# Patient Record
Sex: FEMALE | Race: WHITE | NOT HISPANIC OR LATINO | URBAN - METROPOLITAN AREA
[De-identification: names, ages, dates, MRNs, and addresses within clinical notes are randomized per-mention and may not be internally consistent; named-entity substitution may affect disease eponyms.]

---

## 2019-01-12 ENCOUNTER — EMERGENCY (EMERGENCY)
Facility: HOSPITAL | Age: 54
LOS: 0 days | Discharge: HOME | End: 2019-01-12
Attending: EMERGENCY MEDICINE | Admitting: EMERGENCY MEDICINE
Payer: COMMERCIAL

## 2019-01-12 VITALS — SYSTOLIC BLOOD PRESSURE: 160 MMHG | HEART RATE: 87 BPM | DIASTOLIC BLOOD PRESSURE: 81 MMHG

## 2019-01-12 VITALS — HEIGHT: 58 IN | WEIGHT: 154.98 LBS

## 2019-01-12 DIAGNOSIS — S62.617A DISPLACED FRACTURE OF PROXIMAL PHALANX OF LEFT LITTLE FINGER, INITIAL ENCOUNTER FOR CLOSED FRACTURE: ICD-10-CM
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
## 2019-01-12 PROBLEM — Z00.00 ENCOUNTER FOR PREVENTIVE HEALTH EXAMINATION: Status: ACTIVE | Noted: 2019-01-12

## 2019-01-12 PROCEDURE — 99284 EMERGENCY DEPT VISIT MOD MDM: CPT

## 2019-01-12 PROCEDURE — 73140 X-RAY EXAM OF FINGER(S): CPT | Mod: 26,LT,76

## 2019-01-12 RX ORDER — CEFAZOLIN SODIUM 1 G
1000 VIAL (EA) INJECTION ONCE
Qty: 0 | Refills: 0 | Status: DISCONTINUED | OUTPATIENT
Start: 2019-01-12 | End: 2019-01-12

## 2019-01-12 RX ORDER — IBUPROFEN 200 MG
600 TABLET ORAL ONCE
Qty: 0 | Refills: 0 | Status: COMPLETED | OUTPATIENT
Start: 2019-01-12 | End: 2019-01-12

## 2019-01-12 RX ORDER — CEPHALEXIN 500 MG
1 CAPSULE ORAL
Qty: 12 | Refills: 0
Start: 2019-01-12 | End: 2019-01-14

## 2019-01-12 RX ORDER — CEFAZOLIN SODIUM 1 G
1000 VIAL (EA) INJECTION ONCE
Qty: 0 | Refills: 0 | Status: COMPLETED | OUTPATIENT
Start: 2019-01-12 | End: 2019-01-12

## 2019-01-12 RX ADMIN — Medication 1000 MILLIGRAM(S): at 12:47

## 2019-01-12 NOTE — ED STATDOCS - CARE PLAN
Principal Discharge DX:	Finger dislocation, initial encounter  Secondary Diagnosis:	Finger fracture, left

## 2019-01-12 NOTE — ED ADULT NURSE NOTE - OBJECTIVE STATEMENT
trip and fall today at 11 am with hand extended.  deformity of left pinky.  patient did not take any pain medications PTA.  No LOC, did not hit her head

## 2019-01-12 NOTE — ED STATDOCS - PROGRESS NOTE DETAILS
Patient seen and evaluated.  +fx and dislocation.  Case d/w Dr. Saenz who will evaluate the patient -Justin Warner PA-C finger splinted by Dr. Saenz, she will follow up in the office -Justin Warner PA-C

## 2019-01-12 NOTE — ED STATDOCS - OBJECTIVE STATEMENT
54 y/o female with no significant PMHx presents to the ED s/p mechanical fall. Pt notes she tripped over a pot hole in the drive way and landed on her hands. +left finger pain. Tdap UTD. NKDA. No other complaints at this time.

## 2019-01-12 NOTE — ED STATDOCS - DATE/TIME 1
[address] [facility] 
 
 
 [address] 
[phone] Patient: Carol Burk MRN: [id number] :1952 A check jordyn indicates which time of day the medication should be taken. My Medications ASK your doctor about these medications Instructions Each Dose to Equal  
 Morning Noon Evening Bedtime * albuterol 90 mcg/actuation inhaler Commonly known as:  PROAIR HFA Your last dose was: Your next dose is: Take 2 Puffs by inhalation every four (4) hours as needed for Wheezing. 2 Puff * albuterol 2.5 mg /3 mL (0.083 %) nebulizer solution Commonly known as:  PROVENTIL VENTOLIN Your last dose was: Your next dose is:    
   
   
 Use every 4 hours in nebulizer machine  
     
   
   
   
  
 amphetamine-dextroamphetamine XR 30 mg XR capsule Commonly known as:  ADDERALL XR Your last dose was: Your next dose is: Take 30 mg by mouth every morningIndications: Narcolepsy Syndrome. 30 mg  
    
   
   
   
  
 ARMOUR THYROID 15 mg tablet Generic drug:  thyroid (Pork) Your last dose was: Your next dose is: Take 15 mg by mouth daily. 15 mg  
    
   
   
   
  
 budesonide-formoterol 160-4.5 mcg/actuation Hfaa Commonly known as:  SYMBICORT Your last dose was: Your next dose is: Take 2 Puffs by inhalation two (2) times a day. 2 Puff  
    
   
   
   
  
 dicyclomine 10 mg capsule Commonly known as:  BENTYL Your last dose was: Your next dose is: TAKE 1 CAPSULE BY ORAL ROUTE 3 TIMES EVERY DAY  
     
   
   
   
  
 fluticasone 50 mcg/actuation nasal spray Commonly known as:  Stacey Krunal Your last dose was: Your next dose is: 2 Sprays by Both Nostrils route daily. 2 Spray  
    
   
   
   
  
 ibuprofen 800 mg tablet Commonly known as:  MOTRIN Your last dose was: Your next dose is: Take 1 Tab by mouth every eight (8) hours as needed for Pain. 800 mg LINZESS 145 mcg Cap capsule Generic drug:  linaclotide Your last dose was: Your next dose is: Take 1 Cap by mouth Daily (before breakfast). 145 mcg  
    
   
   
   
  
 lisinopril 10 mg tablet Commonly known as:  Larson Sharon Your last dose was: Your next dose is: Take 1 Tab by mouth daily. 10 mg  
    
   
   
   
  
 multivitamin tablet Commonly known as:  ONE A DAY Your last dose was: Your next dose is: Take 1 Tab by mouth daily. Stop 11/1/11  
 1 Tab OTHER Your last dose was: Your next dose is:    
   
   
 by IntraMUSCular route Before breakfast, lunch, dinner and at bedtime. Indications: Someroelin (human growth hormone) injections-Dr Rodriguez-  
     
   
   
   
  
 pantoprazole 40 mg tablet Commonly known as:  PROTONIX Your last dose was: Your next dose is: One dialy  
     
   
   
   
  
 sucralfate 100 mg/mL suspension Commonly known as:  [facility] Your last dose was: Your next dose is:    
   
   
 1 tsp every 8 hours for esophageal pain * Notice: This list has 2 medication(s) that are the same as other medications prescribed for you. Read the directions carefully, and ask your doctor or other care provider to review them with you. 12-Jan-2019 12:54

## 2019-01-12 NOTE — ED STATDOCS - ATTENDING CONTRIBUTION TO CARE
I, Atul Boggs, performed the initial face to face bedside interview with this patient regarding history of present illness, review of symptoms and relevant past medical, social and family history.  I completed an independent physical examination.  I was the initial provider who evaluated this patient. I have signed out the follow up of any pending tests (i.e. labs, radiological studies) to the ACP.  I have communicated the patient’s plan of care and disposition with the ACP.  The history, relevant review of systems, past medical and surgical history, medical decision making, and physical examination was documented by the scribe in my presence and I attest to the accuracy of the documentation.

## 2019-01-12 NOTE — ED STATDOCS - MUSCULOSKELETAL, MLM
range of motion is not limited. Fifth finger of left hand lateral aspect from proximal phalanx to middle phalanx. +Swelling. Angulated with deformity.

## 2020-01-31 NOTE — ED STATDOCS - NEUROLOGICAL, MLM
Urinary Tract Infection, Adult  Introduction  A urinary tract infection (UTI) is an infection of any part of the urinary tract. The urinary tract includes the:  · Kidneys.  · Ureters.  · Bladder.  · Urethra.  These organs make, store, and get rid of pee (urine) in the body.  Follow these instructions at home:  · Take over-the-counter and prescription medicines only as told by your doctor.  · If you were prescribed an antibiotic medicine, take it as told by your doctor. Do not stop taking the antibiotic even if you start to feel better.  · Avoid the following drinks:  ¨ Alcohol.  ¨ Caffeine.  ¨ Tea.  ¨ Carbonated drinks.  · Drink enough fluid to keep your pee clear or pale yellow.  · Keep all follow-up visits as told by your doctor. This is important.  · Make sure to:  ¨ Empty your bladder often and completely. Do not to hold pee for long periods of time.  ¨ Empty your bladder before and after sex.  ¨ Wipe from front to back after a bowel movement if you are female. Use each tissue one time when you wipe.  Contact a doctor if:  · You have back pain.  · You have a fever.  · You feel sick to your stomach (nauseous).  · You throw up (vomit).  · Your symptoms do not get better after 3 days.  · Your symptoms go away and then come back.  Get help right away if:  · You have very bad back pain.  · You have very bad lower belly (abdominal) pain.  · You are throwing up and cannot keep down any medicines or water.  This information is not intended to replace advice given to you by your health care provider. Make sure you discuss any questions you have with your health care provider.  Document Released: 06/05/2009 Document Revised: 05/25/2017 Document Reviewed: 11/07/2016  © 2017 Elsevier    
sensation is normal and strength is normal.

## 2022-11-16 ENCOUNTER — TRANSCRIPTION ENCOUNTER (OUTPATIENT)
Age: 57
End: 2022-11-16

## 2022-11-16 ENCOUNTER — OUTPATIENT (OUTPATIENT)
Dept: OUTPATIENT SERVICES | Facility: HOSPITAL | Age: 57
LOS: 1 days | End: 2022-11-16
Payer: COMMERCIAL

## 2022-11-16 VITALS
OXYGEN SATURATION: 100 % | TEMPERATURE: 99 F | HEIGHT: 58 IN | RESPIRATION RATE: 14 BRPM | SYSTOLIC BLOOD PRESSURE: 126 MMHG | DIASTOLIC BLOOD PRESSURE: 68 MMHG | WEIGHT: 162.04 LBS | HEART RATE: 82 BPM

## 2022-11-16 DIAGNOSIS — Z01.818 ENCOUNTER FOR OTHER PREPROCEDURAL EXAMINATION: ICD-10-CM

## 2022-11-16 DIAGNOSIS — Z98.890 OTHER SPECIFIED POSTPROCEDURAL STATES: Chronic | ICD-10-CM

## 2022-11-16 DIAGNOSIS — M20.42 OTHER HAMMER TOE(S) (ACQUIRED), LEFT FOOT: ICD-10-CM

## 2022-11-16 DIAGNOSIS — I67.1 CEREBRAL ANEURYSM, NONRUPTURED: Chronic | ICD-10-CM

## 2022-11-16 DIAGNOSIS — Z98.891 HISTORY OF UTERINE SCAR FROM PREVIOUS SURGERY: Chronic | ICD-10-CM

## 2022-11-16 LAB
ALBUMIN SERPL ELPH-MCNC: 3.8 G/DL — SIGNIFICANT CHANGE UP (ref 3.3–5)
ALP SERPL-CCNC: 165 U/L — HIGH (ref 40–120)
ALT FLD-CCNC: 31 U/L — SIGNIFICANT CHANGE UP (ref 12–78)
ANION GAP SERPL CALC-SCNC: 8 MMOL/L — SIGNIFICANT CHANGE UP (ref 5–17)
AST SERPL-CCNC: 22 U/L — SIGNIFICANT CHANGE UP (ref 15–37)
BILIRUB SERPL-MCNC: 0.3 MG/DL — SIGNIFICANT CHANGE UP (ref 0.2–1.2)
BUN SERPL-MCNC: 11 MG/DL — SIGNIFICANT CHANGE UP (ref 7–23)
CALCIUM SERPL-MCNC: 9.1 MG/DL — SIGNIFICANT CHANGE UP (ref 8.5–10.1)
CHLORIDE SERPL-SCNC: 107 MMOL/L — SIGNIFICANT CHANGE UP (ref 96–108)
CO2 SERPL-SCNC: 26 MMOL/L — SIGNIFICANT CHANGE UP (ref 22–31)
CREAT SERPL-MCNC: 0.9 MG/DL — SIGNIFICANT CHANGE UP (ref 0.5–1.3)
EGFR: 76 ML/MIN/1.73M2 — SIGNIFICANT CHANGE UP
GLUCOSE SERPL-MCNC: 98 MG/DL — SIGNIFICANT CHANGE UP (ref 70–99)
HCG UR QL: NEGATIVE — SIGNIFICANT CHANGE UP
HCT VFR BLD CALC: 37.8 % — SIGNIFICANT CHANGE UP (ref 34.5–45)
HGB BLD-MCNC: 12 G/DL — SIGNIFICANT CHANGE UP (ref 11.5–15.5)
MCHC RBC-ENTMCNC: 26.5 PG — LOW (ref 27–34)
MCHC RBC-ENTMCNC: 31.7 GM/DL — LOW (ref 32–36)
MCV RBC AUTO: 83.4 FL — SIGNIFICANT CHANGE UP (ref 80–100)
NRBC # BLD: 0 /100 WBCS — SIGNIFICANT CHANGE UP (ref 0–0)
PLATELET # BLD AUTO: 343 K/UL — SIGNIFICANT CHANGE UP (ref 150–400)
POTASSIUM SERPL-MCNC: 3.9 MMOL/L — SIGNIFICANT CHANGE UP (ref 3.5–5.3)
POTASSIUM SERPL-SCNC: 3.9 MMOL/L — SIGNIFICANT CHANGE UP (ref 3.5–5.3)
PROT SERPL-MCNC: 7.2 G/DL — SIGNIFICANT CHANGE UP (ref 6–8.3)
RBC # BLD: 4.53 M/UL — SIGNIFICANT CHANGE UP (ref 3.8–5.2)
RBC # FLD: 16.9 % — HIGH (ref 10.3–14.5)
SODIUM SERPL-SCNC: 141 MMOL/L — SIGNIFICANT CHANGE UP (ref 135–145)
WBC # BLD: 9.18 K/UL — SIGNIFICANT CHANGE UP (ref 3.8–10.5)
WBC # FLD AUTO: 9.18 K/UL — SIGNIFICANT CHANGE UP (ref 3.8–10.5)

## 2022-11-16 PROCEDURE — 36415 COLL VENOUS BLD VENIPUNCTURE: CPT

## 2022-11-16 PROCEDURE — 93005 ELECTROCARDIOGRAM TRACING: CPT

## 2022-11-16 PROCEDURE — 80053 COMPREHEN METABOLIC PANEL: CPT

## 2022-11-16 PROCEDURE — 93010 ELECTROCARDIOGRAM REPORT: CPT

## 2022-11-16 PROCEDURE — 85027 COMPLETE CBC AUTOMATED: CPT

## 2022-11-16 PROCEDURE — G0463: CPT

## 2022-11-16 PROCEDURE — 81025 URINE PREGNANCY TEST: CPT

## 2022-11-16 NOTE — H&P PST ADULT - NSICDXPASTSURGICALHX_GEN_ALL_CORE_FT
PAST SURGICAL HISTORY:  Brain aneurysm 1977 Age 11    H/O  section X 2 2000 & 2006    H/O colonoscopy     H/O dilation and curettage X 5 following missed ab    S/P breast lumpectomy Left breast 2019 followed by radiation therapy

## 2022-11-16 NOTE — H&P PST ADULT - FALL HARM RISK - UNIVERSAL INTERVENTIONS
Bed in lowest position, wheels locked, appropriate side rails in place/Call bell, personal items and telephone in reach/Instruct patient to call for assistance before getting out of bed or chair/Non-slip footwear when patient is out of bed/[place] to call system/Physically safe environment - no spills, clutter or unnecessary equipment/Purposeful Proactive Rounding/Room/bathroom lighting operational, light cord in reach

## 2022-11-16 NOTE — H&P PST ADULT - HISTORY OF PRESENT ILLNESS
After Visit Summary   11/21/2018    Moo Say    MRN: [id number]           Patient Information     Date Of Birth          1977        Visit Information        Provider Department      11/21/2018 10:00 AM Azul Olivares MD [facility]        Today's Diagnoses     High risk pregnancy, antepartum    -  1    Decreased vision        Nausea/vomiting in pregnancy        8 weeks gestation of pregnancy        RUQ abdominal pain           Follow-ups after your visit        Additional Services     OPHTHALMOLOGY ADULT REFERRAL       Patient prefers to be called    Reason for Referral: Right eye blindness     needed: Yes  Language: Nia    May leave message on voicemail: Yes    ([place] Only) Referral should be tracked (Yes/No)?                  Your next 10 appointments already scheduled     Dec 05, 2018  9:20 AM CST   NEW OB with Azul Olivares MD   [facility] ([facility])    [address] [place] [id number]   [phone]              Future tests that were ordered for you today     Open Future Orders        Priority Expected Expires Ordered    OPHTHALMOLOGY ADULT REFERRAL Routine  11/21/2019 11/21/2018            Who to contact     Please call your clinic at [phone] to:    Ask questions about your health    Make or cancel appointments    Discuss your medicines    Learn about your test results    Speak to your doctor            Additional Information About Your Visit        MyChart Information     Lightswitcht is an electronic gateway that provides easy, online access to your medical records. With [employer], you can request a clinic appointment, read your test results, renew a prescription or communicate with your care team.     To sign up for Lightswitcht visit the website at [email or web address]   You will be asked to enter the access code listed below, as well as some personal information. Please follow the directions to create your username and password.     Your  "access code is: [email or web address]  Expires: 2019  2:46 PM     Your access code will  in 90 days. If you need help or a new code, please contact your [facility] or call [phone] for assistance.        Care EveryWhere ID     This is your Care EveryWhere ID. This could be used by other organizations to access your [facility] medical records  [id number]        Your Vitals Were     Pulse Temperature Respirations Height Last Period BMI (Body Mass Index)    69 97.8  F (36.6  C) 18 5' 4\" (162.6 cm) 2018 (Within Days) 32.2 kg/m2       Blood Pressure from Last 3 Encounters:   18 118/84   18 112/74   10/31/18 118/79    Weight from Last 3 Encounters:   18 187 lb 9.6 oz (85.1 kg)   10/31/18 185 lb 12.8 oz (84.3 kg)              We Performed the Following     ABO/Rh Type-HML ([facility])     Antibody Screen ([facility])     CBC with Plt ([facility])     Chlamydia/Gono Amplified ([facility])     Culture Urine ([facility])     GYN Cytology ([facility])     Hemoglobin A1c ([facility])     Hepatic Profile ([facility])     Hepatitis B Surface Ab ([facility])     Hepatitis B Surface Ag ([facility])     HIV Ag/Ab Screen [place] ([facility])     Lead, Blood ([facility])     Rubella  IgG ([facility])     Syphilis Screen [place] ([facility])     Urinalysis([facility])     Eunice Zoster Imm Status Emmanuelle ([facility])          Today's Medication Changes          These changes are accurate as of 18 11:26 AM.  If you have any questions, ask your nurse or doctor.               Start taking these medicines.        Dose/Directions    meclizine 25 MG tablet   Commonly known as:  ANTIVERT   Used for:  High risk pregnancy, antepartum   Started by:  Azul Olivares MD        Dose:  25 mg   Take 1 tablet (25 mg) by mouth every 6 hours as needed for dizziness For vomiting in pregnancy   Quantity:  30 tablet   Refills:  1            Where to get your medicines      These medications were sent to Our " [facility] - [place], [place] - [address] [facility]  [address], [place] [address]     Phone:  [phone]     doxylamine 25 MG Tabs tablet    meclizine 25 MG tablet    prenatal multivitamin plus iron 27-0.8 MG Tabs per tablet    pyridOXINE 25 MG tablet                Primary Care Provider Office Phone # Fax #    Azul Olivares [phone]       [address] [place] [address]        Equal Access to Services     ALLEN GIFFORD : Hadii aad ku hadasho Soomaali, waaxda luqadaha, qaybta kaalmada adeegyada, waxay idiin hayaan adeeg kharash la'barrie . So [facility] [phone].    ATENCIÓN: Si habla español, tiene a white disposición servicios gratuitos de asistencia lingüística. [facility] [phone].    We comply with applicable federal civil rights laws and [place] laws. We do not discriminate on the basis of race, color, national origin, age, disability, sex, sexual orientation, or gender identity.            Thank you!     Thank you for choosing [facility]  for your care. Our goal is always to provide you with excellent care. Hearing back from our patients is one way we can continue to improve our services. Please take a few minutes to complete the written survey that you may receive in the mail after your visit with us. Thank you!             Your Updated Medication List - Protect others around you: Learn how to safely use, store and throw away your medicines at www.disposemymeds.org.          This list is accurate as of 11/21/18 11:26 AM.  Always use your most recent med list.                   Brand Name Dispense Instructions for use Diagnosis    acetaminophen 325 MG tablet    TYLENOL    100 tablet    Take 2 tablets (650 mg) by mouth every 6 hours as needed for mild pain    Episodic tension-type headache, not intractable       doxylamine 25 MG Tabs tablet    UNISOM    30 each    Take 0.5-1 tablets (12.5-25 mg) by mouth nightly as needed (nausea)    Nausea/vomiting in  pregnancy       meclizine 25 MG tablet    ANTIVERT    30 tablet    Take 1 tablet (25 mg) by mouth every 6 hours as needed for dizziness For vomiting in pregnancy    High risk pregnancy, antepartum       prenatal multivitamin plus iron 27-0.8 MG Tabs per tablet     100 tablet    Take 1 tablet by mouth daily    8 weeks gestation of pregnancy       pyridOXINE 25 MG tablet    vitamin B-6    90 tablet    Take 1 tablet (25 mg) by mouth daily    Nausea/vomiting in pregnancy       timolol 0.5 % ophthalmic solution    TIMOPTIC     1 drop           56 year old female PMH Brain Aneurysm (Age 11), CML, Breast Cancer (DX 2019 Left Breast (S/O Lumpectomy followed by course of Radiation); now with Other Hammertoes Acquired LEFT Foot; presents today for PST prior to Hammertoe Correction LEFT Foot 4th Toe with Mini Fluoroscopy with Dr Camden Schneider on 11/30/2022.     Pt notes her LEFT 4th toe has been bothering her for the last 6-8 weeks.  Notes wearing certain shoes has been difficult for her she has also been wearing her slippers a lot. Pt notes she was seen by Dr Schneider  and following consult, exam and discussions regarding treatment options pt is electing for scheduled procedure.

## 2022-11-16 NOTE — H&P PST ADULT - ASSESSMENT
56 year old female PMH Brain Aneurysm (Age 11), CML, Breast Cancer (DX 2019 Left Breast (S/O Lumpectomy followed by course of Radiation); now with Other Hammertoes Acquired LEFT Foot; scheduled for Hammertoe Correction LEFT Foot 4th Toe with Mini Fluoroscopy with Dr Camden Schneider on 11/30/2022.

## 2022-11-16 NOTE — H&P PST ADULT - PROBLEM SELECTOR PLAN 1
PST labs; CBC, CMP, UcG, EKG. PST labs; CBC, CMP, UcG, EKG. Pt has been seen by her PCP on 11/14/22. Will fax over results for PCP to review and complete medical clearance. Pt has discussed upcoming surgery with her Hematologist who states he is fine for her to continue her Imatanib for her CML. Will call for last note. Pt was however instructed to call her Oncologist Dr Louis to discuss if he wants her to stop her Anastrazole prior to procedure. pt instructed to stop any NSAIDS/Herbal Supplements between now and procedure. may take Tylenol if needed for any pain between now and procedure. Morning of procedure she may take her Synthroid and her Claritin with small sip of water. Pre-op instructions as well as pre-op wash instructions given to pt with understanding verbalized. Discussed with pt she would need to have a COVID swab at [facility] drive thru prior to procedure. Informed pt [facility] admitting department would contact her to schedule her COVID swab appointment. Provided pt with both address and phone number to call  [facility] should she have any questions. Pt verbalizes understanding of all instructions discussed today in [facility]. All questions addressed with pt prior to her leaving the PST department today.

## 2022-11-16 NOTE — H&P PST ADULT - ATTENDING COMMENTS
Pt seen and examined in holding room. Pt denies change in health since last being seen at [facility]. Pt denies CP, palpitations, SOB and KELSEY. Pt denies pain. VSS. Medical clearance in chart. Site and procedure verified with patient. Pt was seen by anesthesia. Consent obtained. Pt to proceed for scheduled procedure and stable for OR. (FRANNY Garcia)

## 2022-11-16 NOTE — H&P PST ADULT - NSICDXPASTMEDICALHX_GEN_ALL_CORE_FT
PAST MEDICAL HISTORY:  Brain aneurysm DX Age 11    Breast cancer LEFT breast Cancer DX 2019 S/P Lumpectomy and Radiation Treatment    CML (chronic myeloid leukemia) Sees Dr Marcel Francis [facility]    Missed abortions X 5    Other hammer toe(s) (acquired), left foot

## 2022-11-23 PROBLEM — C50.919 MALIGNANT NEOPLASM OF UNSPECIFIED SITE OF UNSPECIFIED FEMALE BREAST: Chronic | Status: ACTIVE | Noted: 2022-11-16

## 2022-11-23 PROBLEM — M20.42 OTHER HAMMER TOE(S) (ACQUIRED), LEFT FOOT: Chronic | Status: ACTIVE | Noted: 2022-11-16

## 2022-11-23 PROBLEM — O02.1 MISSED ABORTION: Chronic | Status: ACTIVE | Noted: 2022-11-16

## 2022-11-23 PROBLEM — C92.10 CHRONIC MYELOID LEUKEMIA, BCR/ABL-POSITIVE, NOT HAVING ACHIEVED REMISSION: Chronic | Status: ACTIVE | Noted: 2022-11-16

## 2022-11-23 PROBLEM — I67.1 CEREBRAL ANEURYSM, NONRUPTURED: Chronic | Status: ACTIVE | Noted: 2022-11-16

## 2022-11-28 ENCOUNTER — OUTPATIENT (OUTPATIENT)
Dept: OUTPATIENT SERVICES | Facility: HOSPITAL | Age: 57
LOS: 1 days | End: 2022-11-28
Payer: COMMERCIAL

## 2022-11-28 DIAGNOSIS — Z20.828 CONTACT WITH AND (SUSPECTED) EXPOSURE TO OTHER VIRAL COMMUNICABLE DISEASES: ICD-10-CM

## 2022-11-28 DIAGNOSIS — I67.1 CEREBRAL ANEURYSM, NONRUPTURED: Chronic | ICD-10-CM

## 2022-11-28 DIAGNOSIS — Z98.890 OTHER SPECIFIED POSTPROCEDURAL STATES: Chronic | ICD-10-CM

## 2022-11-28 DIAGNOSIS — Z98.891 HISTORY OF UTERINE SCAR FROM PREVIOUS SURGERY: Chronic | ICD-10-CM

## 2022-11-28 LAB — SARS-COV-2 RNA SPEC QL NAA+PROBE: SIGNIFICANT CHANGE UP

## 2022-11-28 PROCEDURE — U0003: CPT

## 2022-11-28 PROCEDURE — U0005: CPT

## 2022-11-29 ENCOUNTER — TRANSCRIPTION ENCOUNTER (OUTPATIENT)
Age: 57
End: 2022-11-29

## 2022-11-29 NOTE — ASU PATIENT PROFILE, ADULT - NSICDXPASTMEDICALHX_GEN_ALL_CORE_FT
PAST MEDICAL HISTORY:  Brain aneurysm DX Age 11    Breast cancer LEFT breast Cancer DX 2019 S/P Lumpectomy and Radiation Treatment    CML (chronic myeloid leukemia) Sees Dr Marcel Francis [facility]    Missed abortions X 5    Other hammer toe(s) (acquired), left foot

## 2022-11-29 NOTE — ASU PATIENT PROFILE, ADULT - FALL HARM RISK - FALL HARM RISK
Bone Health  Bones protect organs, store calcium, and anchor muscles. Good health habits, such as eating nutritious foods and exercising regularly, are important for maintaining healthy bones. They can also help to prevent a condition that causes bones to lose density and become weak and brittle (osteoporosis).  WHY IS BONE MASS IMPORTANT?  Bone mass refers to the amount of bone tissue that you have. The higher your bone mass, the stronger your bones. An important step toward having healthy bones throughout life is to have strong and dense bones during childhood. A young adult who has a high bone mass is more likely to have a high bone mass later in life. Bone mass at its greatest it is called peak bone mass.  A large decline in bone mass occurs in older adults. In women, it occurs about the time of menopause. During this time, it is important to practice good health habits, because if more bone is lost than what is replaced, the bones will become less healthy and more likely to break (fracture). If you find that you have a low bone mass, you may be able to prevent osteoporosis or further bone loss by changing your diet and lifestyle.  HOW CAN I FIND OUT IF MY BONE MASS IS LOW?  Bone mass can be measured with an X-ray test that is called a bone mineral density (BMD) test. This test is recommended for all women who are age 65 or older. It may also be recommended for men who are age 70 or older, or for people who are more likely to develop osteoporosis due to:  · Having bones that break easily.  · Having a long-term disease that weakens bones, such as kidney disease or rheumatoid arthritis.  · Having menopause earlier than normal.  · Taking medicine that weakens bones, such as steroids, thyroid hormones, or hormone treatment for breast cancer or prostate cancer.  · Smoking.  · Drinking three or more alcoholic drinks each day.  WHAT ARE THE NUTRITIONAL RECOMMENDATIONS FOR HEALTHY BONES?  To have healthy bones, you need  to get enough of the right minerals and vitamins. Most nutrition experts recommend getting these nutrients from the foods that you eat. Nutritional recommendations vary from person to person. Ask your health care provider what is healthy for you. Here are some general guidelines.  Calcium Recommendations  Calcium is the most important (essential) mineral for bone health. Most people can get enough calcium from their diet, but supplements may be recommended for people who are at risk for osteoporosis. Good sources of calcium include:  · Dairy products, such as low-fat or nonfat milk, cheese, and yogurt.  · Dark green leafy vegetables, such as bok dave and broccoli.  · Calcium-fortified foods, such as orange juice, cereal, bread, soy beverages, and tofu products.  · Nuts, such as almonds.  Follow these recommended amounts for daily calcium intake:  · Children, age 1-3: 700 mg.  · Children, age 4-8: 1,000 mg.  · Children, age 9-13: 1,300 mg.  · Teens, age 14-18: 1,300 mg.  · Adults, age 19-50: 1,000 mg.  · Adults, age 51-70:  · Men: 1,000 mg.  · Women: 1,200 mg.  · Adults, age 71 or older: 1,200 mg.  · Pregnant and breastfeeding females:  · Teens: 1,300 mg.  · Adults: 1,000 mg.  Vitamin D Recommendations  Vitamin D is the most essential vitamin for bone health. It helps the body to absorb calcium. Sunlight stimulates the skin to make vitamin D, so be sure to get enough sunlight. If you live in a cold climate or you do not get outside often, your health care provider may recommend that you take vitamin D supplements. Good sources of vitamin D in your diet include:  · Egg yolks.  · Saltwater fish.  · Milk and cereal fortified with vitamin D.  Follow these recommended amounts for daily vitamin D intake:  · Children and teens, age 1-18: 600 international units.  · Adults, age 50 or younger: 400-800 international units.  · Adults, age 51 or older: 800-1,000 international units.  Other Nutrients  Other nutrients for bone  health include:  · Phosphorus. This mineral is found in meat, poultry, dairy foods, nuts, and legumes. The recommended daily intake for adult men and adult women is 700 mg.  · Magnesium. This mineral is found in seeds, nuts, dark green vegetables, and legumes. The recommended daily intake for adult men is 400-420 mg. For adult women, it is 310-320 mg.  · Vitamin K. This vitamin is found in green leafy vegetables. The recommended daily intake is 120 mg for adult men and 90 mg for adult women.  WHAT TYPE OF PHYSICAL ACTIVITY IS BEST FOR BUILDING AND MAINTAINING HEALTHY BONES?  Weight-bearing and strength-building activities are important for building and maintaining peak bone mass. Weight-bearing activities cause muscles and bones to work against gravity. Strength-building activities increases muscle strength that supports bones. Weight-bearing and muscle-building activities include:  · Walking and hiking.  · Jogging and running.  · Dancing.  · Gym exercises.  · Lifting weights.  · Tennis and racquetball.  · Climbing stairs.  · Aerobics.  Adults should get at least 30 minutes of moderate physical activity on most days. Children should get at least 60 minutes of moderate physical activity on most days. Ask your health care provide what type of exercise is best for you.  WHERE CAN I FIND MORE INFORMATION?  For more information, check out the following websites:  · National Osteoporosis Foundation: http://nof.org/learn/basics  · National Institutes of Health: http://www.niams.nih.gov/Health_Info/Bone/Bone_Health/bone_health_for_life.asp     This information is not intended to replace advice given to you by your health care provider. Make sure you discuss any questions you have with your health care provider.     Document Released: 03/09/2005 Document Revised: 05/03/2016 Document Reviewed: 12/23/2015  [employer] Interactive Patient Education ©2016 [employer]  Nonspecific Chest Pain   Chest pain can be caused by many  different conditions. There is always a chance that your pain could be related to something serious, such as a heart attack or a blood clot in your lungs. Chest pain can also be caused by conditions that are not life-threatening. If you have chest pain, it is very important to follow up with your health care provider.  CAUSES   Chest pain can be caused by:  · Heartburn.  · Pneumonia or bronchitis.  · Anxiety or stress.  · Inflammation around your heart (pericarditis) or lung (pleuritis or pleurisy).  · A blood clot in your lung.  · A collapsed lung (pneumothorax). It can develop suddenly on its own (spontaneous pneumothorax) or from trauma to the chest.  · Shingles infection (varicella-zoster virus).  · Heart attack.  · Damage to the bones, muscles, and cartilage that make up your chest wall. This can include:  ¨ Bruised bones due to injury.  ¨ Strained muscles or cartilage due to frequent or repeated coughing or overwork.  ¨ Fracture to one or more ribs.  ¨ Sore cartilage due to inflammation (costochondritis).  RISK FACTORS   Risk factors for chest pain may include:  · Activities that increase your risk for trauma or injury to your chest.  · Respiratory infections or conditions that cause frequent coughing.  · Medical conditions or overeating that can cause heartburn.  · Heart disease or family history of heart disease.  · Conditions or health behaviors that increase your risk of developing a blood clot.  · Having had chicken pox (varicella zoster).  SIGNS AND SYMPTOMS  Chest pain can feel like:  · Burning or tingling on the surface of your chest or deep in your chest.  · Crushing, pressure, aching, or squeezing pain.  · Dull or sharp pain that is worse when you move, cough, or take a deep breath.  · Pain that is also felt in your back, neck, shoulder, or arm, or pain that spreads to any of these areas.  Your chest pain may come and go, or it may stay constant.  DIAGNOSIS  Lab tests or other studies may be needed  to find the cause of your pain. Your health care provider may have you take a test called an ambulatory ECG (electrocardiogram). An ECG records your heartbeat patterns at the time the test is performed. You may also have other tests, such as:  · Transthoracic echocardiogram (TTE). During echocardiography, sound waves are used to create a picture of all of the heart structures and to look at how blood flows through your heart.  · Transesophageal echocardiogram (HARDEEP). This is a more advanced imaging test that obtains images from inside your body. It allows your health care provider to see your heart in finer detail.  · Cardiac monitoring. This allows your health care provider to monitor your heart rate and rhythm in real time.  · Holter monitor. This is a portable device that records your heartbeat and can help to diagnose abnormal heartbeats. It allows your health care provider to track your heart activity for several days, if needed.  · Stress tests. These can be done through exercise or by taking medicine that makes your heart beat more quickly.  · Blood tests.  · Imaging tests.  TREATMENT   Your treatment depends on what is causing your chest pain. Treatment may include:  · Medicines. These may include:  ¨ Acid blockers for heartburn.  ¨ Anti-inflammatory medicine.  ¨ Pain medicine for inflammatory conditions.  ¨ Antibiotic medicine, if an infection is present.  ¨ Medicines to dissolve blood clots.  ¨ Medicines to treat coronary artery disease.  · Supportive care for conditions that do not require medicines. This may include:  ¨ Resting.  ¨ Applying heat or cold packs to injured areas.  ¨ Limiting activities until pain decreases.  HOME CARE INSTRUCTIONS  · If you were prescribed an antibiotic medicine, finish it all even if you start to feel better.  · Avoid any activities that bring on chest pain.  · Do not use any tobacco products, including cigarettes, chewing tobacco, or electronic cigarettes. If you need help  quitting, ask your health care provider.  · Do not drink alcohol.  · Take medicines only as directed by your health care provider.  · Keep all follow-up visits as directed by your health care provider. This is important. This includes any further testing if your chest pain does not go away.  · If heartburn is the cause for your chest pain, you may be told to keep your head raised (elevated) while sleeping. This reduces the chance that acid will go from your stomach into your esophagus.  · Make lifestyle changes as directed by your health care provider. These may include:  ¨ Getting regular exercise. Ask your health care provider to suggest some activities that are safe for you.  ¨ Eating a heart-healthy diet. A registered dietitian can help you to learn healthy eating options.  ¨ Maintaining a healthy weight.  ¨ Managing diabetes, if necessary.  ¨ Reducing stress.  SEEK MEDICAL CARE IF:  · Your chest pain does not go away after treatment.  · You have a rash with blisters on your chest.  · You have a fever.  SEEK IMMEDIATE MEDICAL CARE IF:   · Your chest pain is worse.  · You have an increasing cough, or you cough up blood.  · You have severe abdominal pain.  · You have severe weakness.  · You faint.  · You have chills.  · You have sudden, unexplained chest discomfort.  · You have sudden, unexplained discomfort in your arms, back, neck, or jaw.  · You have shortness of breath at any time.  · You suddenly start to sweat, or your skin gets clammy.  · You feel nauseous or you vomit.  · You suddenly feel light-headed or dizzy.  · Your heart begins to beat quickly, or it feels like it is skipping beats.  These symptoms may represent a serious problem that is an emergency. Do not wait to see if the symptoms will go away. Get medical help right away. Call your local emergency services (911 in the [place] Do not drive yourself to the hospital.     This information is not intended to replace advice given to you by your health  care provider. Make sure you discuss any questions you have with your health care provider.     Document Released: 09/27/2006 Document Revised: 01/08/2016 Document Reviewed: 07/24/2015  [employer] Interactive Patient Education ©2016 [employer]     No indicators present

## 2022-11-30 ENCOUNTER — OUTPATIENT (OUTPATIENT)
Dept: OUTPATIENT SERVICES | Facility: HOSPITAL | Age: 57
LOS: 1 days | End: 2022-11-30
Payer: COMMERCIAL

## 2022-11-30 ENCOUNTER — TRANSCRIPTION ENCOUNTER (OUTPATIENT)
Age: 57
End: 2022-11-30

## 2022-11-30 VITALS
HEIGHT: 58 IN | RESPIRATION RATE: 14 BRPM | HEART RATE: 86 BPM | SYSTOLIC BLOOD PRESSURE: 166 MMHG | TEMPERATURE: 98 F | WEIGHT: 162.04 LBS | DIASTOLIC BLOOD PRESSURE: 58 MMHG | OXYGEN SATURATION: 100 %

## 2022-11-30 VITALS
HEART RATE: 86 BPM | OXYGEN SATURATION: 99 % | DIASTOLIC BLOOD PRESSURE: 68 MMHG | RESPIRATION RATE: 14 BRPM | SYSTOLIC BLOOD PRESSURE: 136 MMHG

## 2022-11-30 DIAGNOSIS — I67.1 CEREBRAL ANEURYSM, NONRUPTURED: Chronic | ICD-10-CM

## 2022-11-30 DIAGNOSIS — Z98.891 HISTORY OF UTERINE SCAR FROM PREVIOUS SURGERY: Chronic | ICD-10-CM

## 2022-11-30 DIAGNOSIS — Z01.818 ENCOUNTER FOR OTHER PREPROCEDURAL EXAMINATION: ICD-10-CM

## 2022-11-30 DIAGNOSIS — Z98.890 OTHER SPECIFIED POSTPROCEDURAL STATES: Chronic | ICD-10-CM

## 2022-11-30 DIAGNOSIS — M20.42 OTHER HAMMER TOE(S) (ACQUIRED), LEFT FOOT: ICD-10-CM

## 2022-11-30 LAB — HCG UR QL: NEGATIVE — SIGNIFICANT CHANGE UP

## 2022-11-30 PROCEDURE — C1769: CPT

## 2022-11-30 PROCEDURE — C1713: CPT

## 2022-11-30 PROCEDURE — 73630 X-RAY EXAM OF FOOT: CPT | Mod: 26,LT

## 2022-11-30 PROCEDURE — 28285 REPAIR OF HAMMERTOE: CPT | Mod: T3

## 2022-11-30 PROCEDURE — 81025 URINE PREGNANCY TEST: CPT

## 2022-11-30 PROCEDURE — 76000 FLUOROSCOPY <1 HR PHYS/QHP: CPT

## 2022-11-30 PROCEDURE — 73630 X-RAY EXAM OF FOOT: CPT

## 2022-11-30 DEVICE — GWIRE TROCAR TIP 0.86MM MUST ORD MULT OF 6 EA: Type: IMPLANTABLE DEVICE | Site: LEFT | Status: FUNCTIONAL

## 2022-11-30 DEVICE — IMPLANTABLE DEVICE: Type: IMPLANTABLE DEVICE | Site: LEFT | Status: FUNCTIONAL

## 2022-11-30 RX ORDER — ONDANSETRON 8 MG/1
1 TABLET, FILM COATED ORAL
Qty: 0 | Refills: 0 | DISCHARGE

## 2022-11-30 RX ORDER — SODIUM CHLORIDE 9 MG/ML
1000 INJECTION, SOLUTION INTRAVENOUS
Refills: 0 | Status: DISCONTINUED | OUTPATIENT
Start: 2022-11-30 | End: 2022-12-01

## 2022-11-30 RX ORDER — IMATINIB MESYLATE 400 MG/1
1 TABLET, FILM COATED ORAL
Qty: 0 | Refills: 0 | DISCHARGE

## 2022-11-30 RX ORDER — SODIUM CHLORIDE 9 MG/ML
1000 INJECTION, SOLUTION INTRAVENOUS
Refills: 0 | Status: DISCONTINUED | OUTPATIENT
Start: 2022-11-30 | End: 2022-11-30

## 2022-11-30 RX ORDER — OMEGA-3 ACID ETHYL ESTERS 1 G
1 CAPSULE ORAL
Qty: 0 | Refills: 0 | DISCHARGE

## 2022-11-30 RX ORDER — CHOLECALCIFEROL (VITAMIN D3) 125 MCG
2 CAPSULE ORAL
Qty: 0 | Refills: 0 | DISCHARGE

## 2022-11-30 RX ORDER — LEVOTHYROXINE SODIUM 125 MCG
1 TABLET ORAL
Qty: 0 | Refills: 0 | DISCHARGE

## 2022-11-30 RX ORDER — ASCORBIC ACID 60 MG
1 TABLET,CHEWABLE ORAL
Qty: 0 | Refills: 0 | DISCHARGE

## 2022-11-30 RX ORDER — LORATADINE 10 MG/1
1 TABLET ORAL
Qty: 0 | Refills: 0 | DISCHARGE

## 2022-11-30 RX ORDER — ANASTROZOLE 1 MG/1
1 TABLET ORAL
Qty: 0 | Refills: 0 | DISCHARGE

## 2022-11-30 RX ORDER — CEFAZOLIN SODIUM 1 G
2000 VIAL (EA) INJECTION ONCE
Refills: 0 | Status: DISCONTINUED | OUTPATIENT
Start: 2022-11-30 | End: 2022-11-30

## 2022-11-30 RX ORDER — FERROUS SULFATE 325(65) MG
1 TABLET ORAL
Qty: 0 | Refills: 0 | DISCHARGE

## 2022-11-30 RX ORDER — HYDROMORPHONE HYDROCHLORIDE 2 MG/ML
0.5 INJECTION INTRAMUSCULAR; INTRAVENOUS; SUBCUTANEOUS
Refills: 0 | Status: DISCONTINUED | OUTPATIENT
Start: 2022-11-30 | End: 2022-12-01

## 2022-11-30 RX ORDER — ONDANSETRON 8 MG/1
4 TABLET, FILM COATED ORAL ONCE
Refills: 0 | Status: DISCONTINUED | OUTPATIENT
Start: 2022-11-30 | End: 2022-12-01

## 2022-11-30 RX ADMIN — SODIUM CHLORIDE 75 MILLILITER(S): 9 INJECTION, SOLUTION INTRAVENOUS at 14:42

## 2022-11-30 RX ADMIN — SODIUM CHLORIDE 75 MILLILITER(S): 9 INJECTION, SOLUTION INTRAVENOUS at 10:41

## 2022-11-30 NOTE — ASU DISCHARGE PLAN (ADULT/PEDIATRIC) - CARE PROVIDER_API CALL
Camden Schneider (DPM)  Orthopaedic Surgery Surgery  [address] [facility], [address] [place] [address]  Phone: [phone]  Fax: [phone]  Follow Up Time:

## 2022-11-30 NOTE — ASU DISCHARGE PLAN (ADULT/PEDIATRIC) - ASU DC SPECIAL INSTRUCTIONSFT
Patient is s/p left foot 4th digit hammertoe correction.  Leave postop dressing clean, dry, intact. Do not remove or get wet.  Any postop medications sent to patient's pharmacy/give to patient by surgeon prior to procedure.  Full weightbearing as tolerated with stockinette and surgical shoe.   Follow-up with Dr. Schneider in 1 week for first postop appointment as discussed prior to procedure.

## 2022-11-30 NOTE — ASU DISCHARGE PLAN (ADULT/PEDIATRIC) - NS MD DC FALL RISK RISK
For information on Fall & Injury Prevention, visit: [email or web address] OR  [email or web address] OR  https://www.cdc.gov/steadi/patient.html

## 2023-02-10 NOTE — H&P PST ADULT - VENOUS THROMBOEMBOLISM BMI
NURSING ADMISSION NOTE      Patient admitted via bed. Oriented to room. Safety precautions initiated. Bed in low position. Call light in reach. Assumed care of patient at 1630 as a transfer. A&ox1-2, patient mostly nonverbal, shakes head to answer yes/no. Will say some words. Right side flaccid and expressive aphasia present, baseline per reports. Room air, VSS, NSR/ST on tele, 90-100s  [place] in place, yellow urine present  G-Tube in place, tube feedings infusing per orders. Continue NPO. Zosyn infusing, see MAR. Continue q2 turns. Call light in reach, bed alarm on, will continue to monitor. POC: Video Swallow Eval tomorrow? (1) obesity (BMI greater than 25)

## 2024-09-03 ENCOUNTER — APPOINTMENT (OUTPATIENT)
Dept: UROLOGY | Facility: CLINIC | Age: 59
End: 2024-09-03

## 2024-09-03 VITALS
HEIGHT: 58 IN | WEIGHT: 170 LBS | DIASTOLIC BLOOD PRESSURE: 80 MMHG | BODY MASS INDEX: 35.68 KG/M2 | SYSTOLIC BLOOD PRESSURE: 177 MMHG

## 2024-09-03 DIAGNOSIS — C92.11 CHRONIC MYELOID LEUKEMIA, BCR/ABL-POSITIVE, IN REMISSION: ICD-10-CM

## 2024-09-03 DIAGNOSIS — N39.0 URINARY TRACT INFECTION, SITE NOT SPECIFIED: ICD-10-CM

## 2024-09-03 DIAGNOSIS — N95.2 POSTMENOPAUSAL ATROPHIC VAGINITIS: ICD-10-CM

## 2024-09-03 DIAGNOSIS — C50.919 MALIGNANT NEOPLASM OF UNSPECIFIED SITE OF UNSPECIFIED FEMALE BREAST: ICD-10-CM

## 2024-09-03 PROCEDURE — 99204 OFFICE O/P NEW MOD 45 MIN: CPT | Mod: 25

## 2024-09-03 PROCEDURE — 51701 INSERT BLADDER CATHETER: CPT

## 2024-09-03 RX ORDER — LEVOTHYROXINE SODIUM 88 UG/1
88 TABLET ORAL
Refills: 0 | Status: ACTIVE | COMMUNITY

## 2024-09-03 RX ORDER — ANASTROZOLE TABLETS 1 MG/1
1 TABLET ORAL
Refills: 0 | Status: ACTIVE | COMMUNITY

## 2024-09-03 RX ORDER — NITROFURANTOIN (MONOHYDRATE/MACROCRYSTALS) 25; 75 MG/1; MG/1
100 CAPSULE ORAL
Qty: 14 | Refills: 0 | Status: ACTIVE | COMMUNITY
Start: 2024-06-30

## 2024-09-03 RX ORDER — METHYLPREDNISOLONE 4 MG/1
4 TABLET ORAL
Qty: 21 | Refills: 0 | Status: ACTIVE | COMMUNITY
Start: 2024-04-05

## 2024-09-03 NOTE — PHYSICAL EXAM
[Normal Appearance] : normal appearance [Well Groomed] : well groomed [General Appearance - In No Acute Distress] : no acute distress [Edema] : no peripheral edema [Respiration, Rhythm And Depth] : normal respiratory rhythm and effort [Exaggerated Use Of Accessory Muscles For Inspiration] : no accessory muscle use [Abdomen Soft] : soft [Abdomen Tenderness] : non-tender [Costovertebral Angle Tenderness] : no ~M costovertebral angle tenderness [Urinary Bladder Findings] : the bladder was normal on palpation [Normal Station and Gait] : the gait and station were normal for the patient's age [] : no rash [No Focal Deficits] : no focal deficits [Oriented To Time, Place, And Person] : oriented to person, place, and time [Affect] : the affect was normal [Mood] : the mood was normal [No Palpable Adenopathy] : no palpable adenopathy [de-identified] : Vaginal dryness and atrophy. Straight cath for UA and cx hypertonus, no tenderness.

## 2024-09-03 NOTE — PHYSICAL EXAM
[Normal Appearance] : normal appearance [Well Groomed] : well groomed [General Appearance - In No Acute Distress] : no acute distress [Edema] : no peripheral edema [Respiration, Rhythm And Depth] : normal respiratory rhythm and effort [Exaggerated Use Of Accessory Muscles For Inspiration] : no accessory muscle use [Abdomen Soft] : soft [Abdomen Tenderness] : non-tender [Costovertebral Angle Tenderness] : no ~M costovertebral angle tenderness [Urinary Bladder Findings] : the bladder was normal on palpation [Normal Station and Gait] : the gait and station were normal for the patient's age [] : no rash [No Focal Deficits] : no focal deficits [Oriented To Time, Place, And Person] : oriented to person, place, and time [Affect] : the affect was normal [Mood] : the mood was normal [No Palpable Adenopathy] : no palpable adenopathy [de-identified] : Vaginal dryness and atrophy. Straight cath for UA and cx hypertonus, no tenderness.

## 2024-09-03 NOTE — ASSESSMENT
[FreeTextEntry1] : 59 y/o with a few episodes of UTI within several months. Vaginal Atrophy. Increase hydration Vaginal moisturizer   Becasue of history recommend Renal bladder US. Discussed Cystoscopy, wishes to proceed.  Cystoscopy. Procedure explained. Risks including bladder / urethral injury, dysuria, UTI, bleeding, passage of clots.

## 2024-09-03 NOTE — HISTORY OF PRESENT ILLNESS
[FreeTextEntry1] : SIENNA CHAU  58 year F presents for recent UTI's. 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 Intermittently there have been episodes where her WBC has risen, and she felt she had a UTI. She was treated with decrease in her WBC. This happened within several months and on one occasion the antibiotic had to be switched Other wise voids well. Advised by Oncologist to be evaluated by Urologist  She also had breast cancer and is on Anastrozole. Denies hematuria or dysuria  Denies family  malignancies   G2 -  x 2

## 2024-09-04 ENCOUNTER — NON-APPOINTMENT (OUTPATIENT)
Age: 59
End: 2024-09-04

## 2024-09-08 ENCOUNTER — OUTPATIENT (OUTPATIENT)
Dept: OUTPATIENT SERVICES | Facility: HOSPITAL | Age: 59
LOS: 1 days | End: 2024-09-08
Payer: COMMERCIAL

## 2024-09-08 DIAGNOSIS — Z98.890 OTHER SPECIFIED POSTPROCEDURAL STATES: Chronic | ICD-10-CM

## 2024-09-08 DIAGNOSIS — Z98.891 HISTORY OF UTERINE SCAR FROM PREVIOUS SURGERY: Chronic | ICD-10-CM

## 2024-09-08 DIAGNOSIS — N39.0 URINARY TRACT INFECTION, SITE NOT SPECIFIED: ICD-10-CM

## 2024-09-08 DIAGNOSIS — I67.1 CEREBRAL ANEURYSM, NONRUPTURED: Chronic | ICD-10-CM

## 2024-09-08 PROCEDURE — 76770 US EXAM ABDO BACK WALL COMP: CPT

## 2024-09-09 ENCOUNTER — NON-APPOINTMENT (OUTPATIENT)
Age: 59
End: 2024-09-09

## 2024-09-09 LAB
APPEARANCE: CLEAR
BACTERIA UR CULT: NORMAL
BACTERIA: NEGATIVE /HPF
BILIRUBIN URINE: NEGATIVE
BLOOD URINE: NEGATIVE
CAST: 0 /LPF
COLOR: YELLOW
EPITHELIAL CELLS: 0 /HPF
GLUCOSE QUALITATIVE U: NEGATIVE MG/DL
KETONES URINE: NEGATIVE MG/DL
LEUKOCYTE ESTERASE URINE: NEGATIVE
MICROSCOPIC-UA: NORMAL
NITRITE URINE: NEGATIVE
PH URINE: 6
PROTEIN URINE: NEGATIVE MG/DL
RED BLOOD CELLS URINE: 0 /HPF
SPECIFIC GRAVITY URINE: 1.01
UROBILINOGEN URINE: 0.2 MG/DL
WHITE BLOOD CELLS URINE: 0 /HPF

## 2024-10-02 ENCOUNTER — APPOINTMENT (OUTPATIENT)
Dept: UROLOGY | Facility: CLINIC | Age: 59
End: 2024-10-02
Payer: COMMERCIAL

## 2024-10-02 ENCOUNTER — APPOINTMENT (OUTPATIENT)
Dept: UROLOGY | Facility: CLINIC | Age: 59
End: 2024-10-02

## 2024-10-02 VITALS
TEMPERATURE: 98.4 F | HEART RATE: 95 BPM | OXYGEN SATURATION: 99 % | WEIGHT: 157 LBS | RESPIRATION RATE: 16 BRPM | HEIGHT: 58 IN | BODY MASS INDEX: 32.95 KG/M2 | SYSTOLIC BLOOD PRESSURE: 161 MMHG | DIASTOLIC BLOOD PRESSURE: 89 MMHG

## 2024-10-02 DIAGNOSIS — N39.0 URINARY TRACT INFECTION, SITE NOT SPECIFIED: ICD-10-CM

## 2024-10-02 DIAGNOSIS — N95.2 POSTMENOPAUSAL ATROPHIC VAGINITIS: ICD-10-CM

## 2024-10-02 DIAGNOSIS — C50.919 MALIGNANT NEOPLASM OF UNSPECIFIED SITE OF UNSPECIFIED FEMALE BREAST: ICD-10-CM

## 2024-10-02 DIAGNOSIS — N35.919 UNSPECIFIED URETHRAL STRICTURE, MALE, UNSPECIFIED SITE: ICD-10-CM

## 2024-10-02 LAB
BILIRUB UR QL STRIP: NEGATIVE
CLARITY UR: CLEAR
COLLECTION METHOD: NORMAL
GLUCOSE UR-MCNC: NEGATIVE
HCG UR QL: 0.2 EU/DL
HGB UR QL STRIP.AUTO: NORMAL
KETONES UR-MCNC: NEGATIVE
LEUKOCYTE ESTERASE UR QL STRIP: NORMAL
NITRITE UR QL STRIP: NEGATIVE
PH UR STRIP: 6.5
PROT UR STRIP-MCNC: NEGATIVE
SP GR UR STRIP: 1.01

## 2024-10-02 PROCEDURE — 81003 URINALYSIS AUTO W/O SCOPE: CPT | Mod: QW

## 2024-10-02 PROCEDURE — 99213 OFFICE O/P EST LOW 20 MIN: CPT | Mod: 25

## 2024-10-02 PROCEDURE — 52281 CYSTOSCOPY AND TREATMENT: CPT

## 2024-10-02 NOTE — ASSESSMENT
[FreeTextEntry1] : 58-year-old female with history of recurrent UTIs likely to poor vaginal health with doing well on [place] meatal stenosis noted dilated prior to cystoscopy.

## 2024-10-02 NOTE — PHYSICAL EXAM
[Normal Appearance] : normal appearance [Well Groomed] : well groomed [General Appearance - In No Acute Distress] : no acute distress [Edema] : no peripheral edema [Respiration, Rhythm And Depth] : normal respiratory rhythm and effort [Exaggerated Use Of Accessory Muscles For Inspiration] : no accessory muscle use [Abdomen Soft] : soft [Abdomen Tenderness] : non-tender [Costovertebral Angle Tenderness] : no ~M costovertebral angle tenderness [Urinary Bladder Findings] : the bladder was normal on palpation [Normal Station and Gait] : the gait and station were normal for the patient's age [] : no rash [No Focal Deficits] : no focal deficits [Oriented To Time, Place, And Person] : oriented to person, place, and time [Affect] : the affect was normal [Mood] : the mood was normal [No Palpable Adenopathy] : no palpable adenopathy [de-identified] : Improved vaginal dryness meatal narrowing requiring dilatation for cystoscopy entry [Chaperone Present] : A chaperone was present in the examining room during all aspects of the physical examination [FreeTextEntry2] : Karen Bowens

## 2024-10-02 NOTE — HISTORY OF PRESENT ILLNESS
[FreeTextEntry1] : 58-year-old female with history of recurrent UTIs for cystoscopy today.  Compliant with [place] for vaginal dryness and history of breast cancer.  Feels better and less inflamed in that area. Cystoscopy. Procedure explained. Risks including bladder / urethral injury, dysuria, UTI, bleeding, passage of clots. Renal bladder ultrasound reviewed with the patient negative for pathology.  Large bladder capacity postvoid residual 12 mL.

## 2024-12-20 ENCOUNTER — TRANSCRIPTION ENCOUNTER (OUTPATIENT)
Age: 59
End: 2024-12-20

## 2025-01-17 NOTE — ED STATDOCS - EYES, MLM
----- Message from Brittany Jacob sent at 1/12/2025  7:59 AM EST -----  Please review patient's result message with him, I do not believe he is read.  Thanks   clear bilaterally.  Pupils equal, round, and reactive to light.

## 2025-04-30 ENCOUNTER — NON-APPOINTMENT (OUTPATIENT)
Age: 60
End: 2025-04-30

## 2025-05-30 ENCOUNTER — APPOINTMENT (OUTPATIENT)
Dept: UROLOGY | Facility: CLINIC | Age: 60
End: 2025-05-30
Payer: COMMERCIAL

## 2025-05-30 VITALS
TEMPERATURE: 97.5 F | DIASTOLIC BLOOD PRESSURE: 78 MMHG | WEIGHT: 145 LBS | HEIGHT: 58 IN | BODY MASS INDEX: 30.44 KG/M2 | HEART RATE: 90 BPM | OXYGEN SATURATION: 99 % | SYSTOLIC BLOOD PRESSURE: 128 MMHG

## 2025-05-30 DIAGNOSIS — N35.919 UNSPECIFIED URETHRAL STRICTURE, MALE, UNSPECIFIED SITE: ICD-10-CM

## 2025-05-30 DIAGNOSIS — C50.919 MALIGNANT NEOPLASM OF UNSPECIFIED SITE OF UNSPECIFIED FEMALE BREAST: ICD-10-CM

## 2025-05-30 DIAGNOSIS — N95.2 POSTMENOPAUSAL ATROPHIC VAGINITIS: ICD-10-CM

## 2025-05-30 LAB
BILIRUB UR QL STRIP: NEGATIVE
CLARITY UR: CLEAR
COLLECTION METHOD: NORMAL
GLUCOSE UR-MCNC: NEGATIVE
HCG UR QL: 0.2 EU/DL
HGB UR QL STRIP.AUTO: NEGATIVE
KETONES UR-MCNC: NEGATIVE
LEUKOCYTE ESTERASE UR QL STRIP: ABNORMAL
NITRITE UR QL STRIP: NEGATIVE
PH UR STRIP: 6.5
PROT UR STRIP-MCNC: NEGATIVE
SP GR UR STRIP: 1.01

## 2025-05-30 PROCEDURE — 99214 OFFICE O/P EST MOD 30 MIN: CPT | Mod: 25

## 2025-05-30 PROCEDURE — 81003 URINALYSIS AUTO W/O SCOPE: CPT | Mod: QW

## 2025-05-30 PROCEDURE — 51798 US URINE CAPACITY MEASURE: CPT

## 2025-05-30 PROCEDURE — 51741 ELECTRO-UROFLOWMETRY FIRST: CPT

## 2025-06-03 RX ORDER — TRIAMCINOLONE ACETONIDE 1 MG/G
0.1 OINTMENT TOPICAL
Qty: 1 | Refills: 0 | Status: ACTIVE | COMMUNITY
Start: 2025-06-03 | End: 1900-01-01

## 2025-06-04 ENCOUNTER — TRANSCRIPTION ENCOUNTER (OUTPATIENT)
Age: 60
End: 2025-06-04

## 2025-06-30 ENCOUNTER — APPOINTMENT (OUTPATIENT)
Dept: UROLOGY | Facility: CLINIC | Age: 60
End: 2025-06-30
Payer: COMMERCIAL

## 2025-06-30 VITALS
BODY MASS INDEX: 30.44 KG/M2 | SYSTOLIC BLOOD PRESSURE: 130 MMHG | WEIGHT: 145 LBS | DIASTOLIC BLOOD PRESSURE: 70 MMHG | HEIGHT: 58 IN

## 2025-06-30 PROCEDURE — 99459 PELVIC EXAMINATION: CPT

## 2025-06-30 PROCEDURE — 51741 ELECTRO-UROFLOWMETRY FIRST: CPT

## 2025-06-30 PROCEDURE — 99213 OFFICE O/P EST LOW 20 MIN: CPT

## 2025-06-30 PROCEDURE — 51798 US URINE CAPACITY MEASURE: CPT

## (undated) DEVICE — TOURNIQUET CUFF 34" DUAL PORT W PLC

## (undated) DEVICE — SOL IRR POUR NS 0.9% 1000ML

## (undated) DEVICE — TOURNIQUET CUFF 30" DUAL PORT W PLC

## (undated) DEVICE — VENODYNE/SCD SLEEVE CALF MEDIUM

## (undated) DEVICE — GLV 7.5 PROTEXIS (WHITE)

## (undated) DEVICE — DRSG KERLIX ROLL 4.5"

## (undated) DEVICE — PACK LOWER EXTREMITY NS PLAINVI

## (undated) DEVICE — DRAPE 3/4 SHEET W REINFORCEMENT 56X77"

## (undated) DEVICE — VENODYNE/SCD SLEEVE CALF LARGE

## (undated) DEVICE — DRSG CAST PLASTER 6" (BLUE)

## (undated) DEVICE — BUR ARTHREX STR 8X2MM

## (undated) DEVICE — SET IRRIGATION TUBING AR-200

## (undated) DEVICE — DRSG CAST FIBERGLASS 4"

## (undated) DEVICE — SUT POLYSORB 4-0 30" V-20 UNDYED

## (undated) DEVICE — DRSG ADAPTIC 3 X 8"

## (undated) DEVICE — WARMING BLANKET UPPER ADULT

## (undated) DEVICE — DRSG KLING 3"

## (undated) DEVICE — DRSG WEBRIL 6"

## (undated) DEVICE — SUT POLYSORB 3-0 30" V-20 UNDYED

## (undated) DEVICE — PLV/PSP-ESU FORCEFX F8I7418A: Type: DURABLE MEDICAL EQUIPMENT

## (undated) DEVICE — DRSG KLING 6"

## (undated) DEVICE — DRSG TELFA 3 X 8

## (undated) DEVICE — DRSG WEBRIL 3"

## (undated) DEVICE — SUT MONOSOF 4-0 18" P-12

## (undated) DEVICE — DRILL BIT ARTHREX CANN 2MM

## (undated) DEVICE — PLV-SCD MACHINE: Type: DURABLE MEDICAL EQUIPMENT